# Patient Record
Sex: MALE | Race: WHITE | NOT HISPANIC OR LATINO | URBAN - METROPOLITAN AREA
[De-identification: names, ages, dates, MRNs, and addresses within clinical notes are randomized per-mention and may not be internally consistent; named-entity substitution may affect disease eponyms.]

---

## 2020-07-01 ENCOUNTER — INPATIENT (INPATIENT)
Facility: HOSPITAL | Age: 65
LOS: 0 days | Discharge: HOME | DRG: 282 | End: 2020-07-02
Attending: INTERNAL MEDICINE | Admitting: INTERNAL MEDICINE
Payer: COMMERCIAL

## 2020-07-01 VITALS
TEMPERATURE: 98 F | RESPIRATION RATE: 18 BRPM | SYSTOLIC BLOOD PRESSURE: 164 MMHG | OXYGEN SATURATION: 100 % | HEART RATE: 83 BPM | HEIGHT: 72 IN | WEIGHT: 220.02 LBS | DIASTOLIC BLOOD PRESSURE: 67 MMHG

## 2020-07-01 DIAGNOSIS — Z98.890 OTHER SPECIFIED POSTPROCEDURAL STATES: Chronic | ICD-10-CM

## 2020-07-01 DIAGNOSIS — I21.4 NON-ST ELEVATION (NSTEMI) MYOCARDIAL INFARCTION: ICD-10-CM

## 2020-07-01 LAB
GLUCOSE BLDC GLUCOMTR-MCNC: 168 MG/DL — HIGH (ref 70–99)
GLUCOSE BLDC GLUCOMTR-MCNC: 177 MG/DL — HIGH (ref 70–99)
GLUCOSE BLDC GLUCOMTR-MCNC: 190 MG/DL — HIGH (ref 70–99)

## 2020-07-01 PROCEDURE — 93010 ELECTROCARDIOGRAM REPORT: CPT

## 2020-07-01 PROCEDURE — 93454 CORONARY ARTERY ANGIO S&I: CPT | Mod: 26

## 2020-07-01 PROCEDURE — 99152 MOD SED SAME PHYS/QHP 5/>YRS: CPT

## 2020-07-01 RX ORDER — GLUCAGON INJECTION, SOLUTION 0.5 MG/.1ML
1 INJECTION, SOLUTION SUBCUTANEOUS ONCE
Refills: 0 | Status: DISCONTINUED | OUTPATIENT
Start: 2020-07-01 | End: 2020-07-02

## 2020-07-01 RX ORDER — PANTOPRAZOLE SODIUM 20 MG/1
1 TABLET, DELAYED RELEASE ORAL
Qty: 0 | Refills: 0 | DISCHARGE

## 2020-07-01 RX ORDER — ATORVASTATIN CALCIUM 80 MG/1
1 TABLET, FILM COATED ORAL
Qty: 0 | Refills: 0 | DISCHARGE

## 2020-07-01 RX ORDER — INSULIN ASPART 100 [IU]/ML
0 INJECTION, SOLUTION SUBCUTANEOUS
Qty: 0 | Refills: 0 | DISCHARGE

## 2020-07-01 RX ORDER — EZETIMIBE 10 MG/1
1 TABLET ORAL
Qty: 0 | Refills: 0 | DISCHARGE

## 2020-07-01 RX ORDER — DRONEDARONE 400 MG/1
400 TABLET, FILM COATED ORAL
Refills: 0 | Status: DISCONTINUED | OUTPATIENT
Start: 2020-07-01 | End: 2020-07-02

## 2020-07-01 RX ORDER — INSULIN GLARGINE 100 [IU]/ML
15 INJECTION, SOLUTION SUBCUTANEOUS AT BEDTIME
Refills: 0 | Status: DISCONTINUED | OUTPATIENT
Start: 2020-07-01 | End: 2020-07-02

## 2020-07-01 RX ORDER — ASPIRIN/CALCIUM CARB/MAGNESIUM 324 MG
1 TABLET ORAL
Qty: 0 | Refills: 0 | DISCHARGE

## 2020-07-01 RX ORDER — DEXTROSE 50 % IN WATER 50 %
15 SYRINGE (ML) INTRAVENOUS ONCE
Refills: 0 | Status: DISCONTINUED | OUTPATIENT
Start: 2020-07-01 | End: 2020-07-02

## 2020-07-01 RX ORDER — PANTOPRAZOLE SODIUM 20 MG/1
40 TABLET, DELAYED RELEASE ORAL
Refills: 0 | Status: DISCONTINUED | OUTPATIENT
Start: 2020-07-01 | End: 2020-07-02

## 2020-07-01 RX ORDER — ALPRAZOLAM 0.25 MG
1 TABLET ORAL
Refills: 0 | Status: DISCONTINUED | OUTPATIENT
Start: 2020-07-01 | End: 2020-07-02

## 2020-07-01 RX ORDER — INSULIN ASPART 100 [IU]/ML
25 INJECTION, SOLUTION SUBCUTANEOUS
Qty: 0 | Refills: 0 | DISCHARGE

## 2020-07-01 RX ORDER — INSULIN LISPRO 100/ML
VIAL (ML) SUBCUTANEOUS
Refills: 0 | Status: DISCONTINUED | OUTPATIENT
Start: 2020-07-01 | End: 2020-07-02

## 2020-07-01 RX ORDER — CLOPIDOGREL BISULFATE 75 MG/1
75 TABLET, FILM COATED ORAL DAILY
Refills: 0 | Status: DISCONTINUED | OUTPATIENT
Start: 2020-07-01 | End: 2020-07-02

## 2020-07-01 RX ORDER — INSULIN GLARGINE 100 [IU]/ML
85 INJECTION, SOLUTION SUBCUTANEOUS
Qty: 0 | Refills: 0 | DISCHARGE

## 2020-07-01 RX ORDER — DEXTROSE 50 % IN WATER 50 %
12.5 SYRINGE (ML) INTRAVENOUS ONCE
Refills: 0 | Status: DISCONTINUED | OUTPATIENT
Start: 2020-07-01 | End: 2020-07-02

## 2020-07-01 RX ORDER — ASPIRIN/CALCIUM CARB/MAGNESIUM 324 MG
81 TABLET ORAL DAILY
Refills: 0 | Status: DISCONTINUED | OUTPATIENT
Start: 2020-07-01 | End: 2020-07-02

## 2020-07-01 RX ORDER — APIXABAN 2.5 MG/1
1 TABLET, FILM COATED ORAL
Qty: 0 | Refills: 0 | DISCHARGE

## 2020-07-01 RX ORDER — RANOLAZINE 500 MG/1
500 TABLET, FILM COATED, EXTENDED RELEASE ORAL
Refills: 0 | Status: DISCONTINUED | OUTPATIENT
Start: 2020-07-01 | End: 2020-07-02

## 2020-07-01 RX ORDER — METOPROLOL TARTRATE 50 MG
100 TABLET ORAL DAILY
Refills: 0 | Status: DISCONTINUED | OUTPATIENT
Start: 2020-07-01 | End: 2020-07-02

## 2020-07-01 RX ORDER — AMLODIPINE BESYLATE 2.5 MG/1
1 TABLET ORAL
Qty: 0 | Refills: 0 | DISCHARGE

## 2020-07-01 RX ORDER — APIXABAN 2.5 MG/1
5 TABLET, FILM COATED ORAL
Refills: 0 | Status: DISCONTINUED | OUTPATIENT
Start: 2020-07-01 | End: 2020-07-02

## 2020-07-01 RX ORDER — AMLODIPINE BESYLATE 2.5 MG/1
5 TABLET ORAL DAILY
Refills: 0 | Status: DISCONTINUED | OUTPATIENT
Start: 2020-07-01 | End: 2020-07-02

## 2020-07-01 RX ORDER — DEXTROSE 50 % IN WATER 50 %
25 SYRINGE (ML) INTRAVENOUS ONCE
Refills: 0 | Status: DISCONTINUED | OUTPATIENT
Start: 2020-07-01 | End: 2020-07-02

## 2020-07-01 RX ORDER — LISINOPRIL 2.5 MG/1
10 TABLET ORAL DAILY
Refills: 0 | Status: DISCONTINUED | OUTPATIENT
Start: 2020-07-01 | End: 2020-07-02

## 2020-07-01 RX ORDER — CLOPIDOGREL BISULFATE 75 MG/1
75 TABLET, FILM COATED ORAL
Qty: 0 | Refills: 0 | DISCHARGE

## 2020-07-01 RX ORDER — FERROUS SULFATE 325(65) MG
325 TABLET ORAL DAILY
Refills: 0 | Status: DISCONTINUED | OUTPATIENT
Start: 2020-07-01 | End: 2020-07-02

## 2020-07-01 RX ORDER — INSULIN LISPRO 100/ML
25 VIAL (ML) SUBCUTANEOUS
Refills: 0 | Status: DISCONTINUED | OUTPATIENT
Start: 2020-07-01 | End: 2020-07-02

## 2020-07-01 RX ORDER — FERROUS SULFATE 325(65) MG
1 TABLET ORAL
Qty: 0 | Refills: 0 | DISCHARGE

## 2020-07-01 RX ORDER — DRONEDARONE 400 MG/1
1 TABLET, FILM COATED ORAL
Qty: 0 | Refills: 0 | DISCHARGE

## 2020-07-01 RX ORDER — INSULIN LISPRO 100/ML
VIAL (ML) SUBCUTANEOUS AT BEDTIME
Refills: 0 | Status: DISCONTINUED | OUTPATIENT
Start: 2020-07-01 | End: 2020-07-02

## 2020-07-01 RX ORDER — DOCUSATE SODIUM 100 MG
1 CAPSULE ORAL
Qty: 0 | Refills: 0 | DISCHARGE

## 2020-07-01 RX ORDER — SODIUM CHLORIDE 9 MG/ML
1000 INJECTION, SOLUTION INTRAVENOUS
Refills: 0 | Status: DISCONTINUED | OUTPATIENT
Start: 2020-07-01 | End: 2020-07-02

## 2020-07-01 RX ORDER — INSULIN DETEMIR 100/ML (3)
15 INSULIN PEN (ML) SUBCUTANEOUS
Qty: 0 | Refills: 0 | DISCHARGE

## 2020-07-01 RX ORDER — ISOSORBIDE DINITRATE 5 MG/1
15 TABLET ORAL
Refills: 0 | Status: DISCONTINUED | OUTPATIENT
Start: 2020-07-01 | End: 2020-07-02

## 2020-07-01 RX ORDER — ATORVASTATIN CALCIUM 80 MG/1
40 TABLET, FILM COATED ORAL AT BEDTIME
Refills: 0 | Status: DISCONTINUED | OUTPATIENT
Start: 2020-07-01 | End: 2020-07-02

## 2020-07-01 RX ADMIN — INSULIN GLARGINE 15 UNIT(S): 100 INJECTION, SOLUTION SUBCUTANEOUS at 22:16

## 2020-07-01 RX ADMIN — Medication 1 MILLIGRAM(S): at 18:42

## 2020-07-01 RX ADMIN — PANTOPRAZOLE SODIUM 40 MILLIGRAM(S): 20 TABLET, DELAYED RELEASE ORAL at 18:41

## 2020-07-01 RX ADMIN — ISOSORBIDE DINITRATE 15 MILLIGRAM(S): 5 TABLET ORAL at 22:53

## 2020-07-01 RX ADMIN — ATORVASTATIN CALCIUM 40 MILLIGRAM(S): 80 TABLET, FILM COATED ORAL at 22:16

## 2020-07-01 RX ADMIN — DRONEDARONE 400 MILLIGRAM(S): 400 TABLET, FILM COATED ORAL at 22:15

## 2020-07-01 RX ADMIN — RANOLAZINE 500 MILLIGRAM(S): 500 TABLET, FILM COATED, EXTENDED RELEASE ORAL at 18:41

## 2020-07-01 NOTE — H&P CARDIOLOGY - HISTORY OF PRESENT ILLNESS
65 year old  male h/o triple vessel CAD, MI x 2, PCI x 2 in [place] with stents x 4 (vessels unknown 1999 and 2014), PVD s/p left LE intervention, parox afib on Eliquis (LAST DOSE: #####), HTN, HLD, GERD, T2DM (managed by ####           A1C ####   complicated by PVD/CAD) who was driving from [place] to [place] and experienced chest pain radiating to left arm which resolved with massaging area and also c/o chest pain when climbing stairs, which was new. Pt admitted to [facility] 6/29/2020 and noted to have RC: peak Cr 1.6 (pt reports baseline 1.3) that normalized to 1.2(on 7/1) after IVF. Pt peak CKMB mass 10.9, now down trending. Labs 7/1 H/H 12.0/37.2, plts 184, wbc 6.44, bun 20, creat 1.2. Pt transferred to [facility] for left heart cath today. Prior to this event pt reports good exercise tolerance, no problem with stairs. Pt with laceration above left eye from hitting on door 6/29/2020.  Outpt cards in [place]: Dr Barak Arenas  Narrative:     Symptoms:        Angina (Class): 3       Ischemic Symptoms: chest pain    Heart Failure: NONE       Systolic/Diastolic/Combined:        NYHA Class (within 2 weeks):     Assessment of LVEF: NOT DONE       EF: NOT DONE       Assessed by:        Date:     Prior Cardiac Interventions:       PCI's: vessels unknown done in [place] '99 and '14       CABG: none    Noninvasive Testing: NOT DONE  Stress Test: Date:        Protocol:        Duration of Exercise:        Symptoms:        EKG Changes:        DTS:        Myocardial Imaging:        Risk Assessment:     Echo: NOT DONE    Antianginal Therapies:        Beta Blockers: toprol       Calcium Channel Blockers norvasc       Long Acting Nitrates: isosorbide dinatrate       Ranexa: no    Associated Risk Factors:        Cerebrovascular Disease: N/A       Chronic Lung Disease: N/A       Peripheral Arterial Disease: n/a       Chronic Kidney Disease (if yes, what is GFR): N/A GRF = 60       Uncontrolled Diabetes (if yes, what is HgbA1C or FBS): N/A       Poorly Controlled Hypertension (if yes, what is SBP): N/A       Morbid Obesity (if yes, what is BMI): N/A       History of Recent Ventricular Arrhythmia: N/A       Inability to Ambulate Safely: N/A       Need for Therapeutic Anticoagulation: yes, afib on eliquis       Antiplatelet or Contrast Allergy: no 65 year old  male h/o triple vessel CAD, MI x 2, PCI x 2 in [place] with stents x 4 (vessels unknown 1999 and 2014), PVD s/p left LE intervention and stent 2015, parox afib on Eliquis (LAST DOSE: Sunday 6/28), HTN, HLD, GERD, T2DM (managed by endo: Dr Currie,  A1C 7.2   complicated by PVD/CAD) who was driving from [place] to [place] and experienced chest pain radiating to left arm which resolved with massaging area and also c/o chest pain when climbing stairs, which was new. Pt admitted to [facility] 6/29/2020 and noted to have RC: peak Cr 1.6 (pt reports baseline 1.3) that normalized to 1.2(on 7/1) after IVF. Pt peak CKMB mass 10.9, now down trending. Labs 7/1 H/H 12.0/37.2, plts 184, wbc 6.44, bun 20, creat 1.2. Pt transferred to [facility] for left heart cath today. Prior to this event pt reports good exercise tolerance, no problem with stairs. Pt with laceration above left eye from hitting on door 6/29/2020.  Outpt cards in [place]: Dr Barak Arenas    Symptoms:        Angina (Class): 3       Ischemic Symptoms: chest pain    Heart Failure: NONE       Systolic/Diastolic/Combined:        NYHA Class (within 2 weeks):     Assessment of LVEF: NOT DONE       EF: NOT DONE       Assessed by:        Date:     Prior Cardiac Interventions:       PCI's: vessels unknown done in [place] '99 and '14       CABG: none    Noninvasive Testing: NOT DONE  Stress Test: Date:        Protocol:        Duration of Exercise:        Symptoms:        EKG Changes:        DTS:        Myocardial Imaging:        Risk Assessment:     Echo: NOT DONE    Antianginal Therapies:        Beta Blockers: toprol       Calcium Channel Blockers norvasc       Long Acting Nitrates: isosorbide dinatrate       Ranexa: no    Associated Risk Factors:        Cerebrovascular Disease: N/A       Chronic Lung Disease: N/A       Peripheral Arterial Disease: n/a       Chronic Kidney Disease (if yes, what is GFR): N/A GRF = 60       Uncontrolled Diabetes (if yes, what is HgbA1C or FBS): N/A       Poorly Controlled Hypertension (if yes, what is SBP): N/A       Morbid Obesity (if yes, what is BMI): N/A       History of Recent Ventricular Arrhythmia: N/A       Inability to Ambulate Safely: N/A       Need for Therapeutic Anticoagulation: yes, afib on eliquis       Antiplatelet or Contrast Allergy: no

## 2020-07-01 NOTE — H&P CARDIOLOGY - ADDITIONAL PE
Mallampati 2  bilat groins without rash or breakdown  2+ DP/PT/radial pulses bilat  right wrist deformity s/p fracture

## 2020-07-01 NOTE — H&P CARDIOLOGY - PMH
Atrial fibrillation  on ELIQUIS  CAD (coronary artery disease)  PCI x 2 in '99 and '14 with stents x 4 in [place] (vessels unknown)  Diabetes  TYPE II on INSULIN  GERD (gastroesophageal reflux disease)    HLD (hyperlipidemia)    HTN (hypertension)    Myocardial infarction  x 2  '99 and ' 14 with stents x 4  PVD (peripheral vascular disease)  with left lower extremity intervention Atrial fibrillation  on ELIQUIS  CAD (coronary artery disease)  PCI x 2 in '99 and '14 with stents x 4 in [place] (vessels unknown)  Diabetes  TYPE II on INSULIN  GERD (gastroesophageal reflux disease)    HLD (hyperlipidemia)    HTN (hypertension)    Myocardial infarction  x 2  '99 and ' 14 with stents x 4  PVD (peripheral vascular disease)  with left lower extremity intervention STENT X 1 2015

## 2020-07-01 NOTE — H&P CARDIOLOGY - FAMILY HISTORY
Grandparent  Still living? No  Family history of esophageal cancer, Age at diagnosis: Age Unknown     Father  Still living? No  Family history of chronic ischemic heart disease, Age at diagnosis: Age Unknown  Family history of diabetes mellitus in grandfather, Age at diagnosis: Age Unknown

## 2020-07-02 VITALS
SYSTOLIC BLOOD PRESSURE: 110 MMHG | HEART RATE: 63 BPM | OXYGEN SATURATION: 97 % | TEMPERATURE: 98 F | RESPIRATION RATE: 18 BRPM | DIASTOLIC BLOOD PRESSURE: 63 MMHG

## 2020-07-02 LAB
GLUCOSE BLDC GLUCOMTR-MCNC: 106 MG/DL — HIGH (ref 70–99)
GLUCOSE BLDC GLUCOMTR-MCNC: 160 MG/DL — HIGH (ref 70–99)
GLUCOSE BLDC GLUCOMTR-MCNC: 165 MG/DL — HIGH (ref 70–99)
GLUCOSE BLDC GLUCOMTR-MCNC: 215 MG/DL — HIGH (ref 70–99)

## 2020-07-02 PROCEDURE — C1894: CPT

## 2020-07-02 PROCEDURE — 93005 ELECTROCARDIOGRAM TRACING: CPT

## 2020-07-02 PROCEDURE — 82962 GLUCOSE BLOOD TEST: CPT

## 2020-07-02 PROCEDURE — 99152 MOD SED SAME PHYS/QHP 5/>YRS: CPT

## 2020-07-02 PROCEDURE — C1887: CPT

## 2020-07-02 PROCEDURE — 99233 SBSQ HOSP IP/OBS HIGH 50: CPT

## 2020-07-02 PROCEDURE — C1769: CPT

## 2020-07-02 PROCEDURE — 93458 L HRT ARTERY/VENTRICLE ANGIO: CPT

## 2020-07-02 PROCEDURE — 93454 CORONARY ARTERY ANGIO S&I: CPT

## 2020-07-02 PROCEDURE — 93010 ELECTROCARDIOGRAM REPORT: CPT

## 2020-07-02 RX ORDER — LISINOPRIL 2.5 MG/1
1 TABLET ORAL
Qty: 30 | Refills: 0
Start: 2020-07-02 | End: 2020-07-31

## 2020-07-02 RX ORDER — METOPROLOL TARTRATE 50 MG
1 TABLET ORAL
Qty: 0 | Refills: 0 | DISCHARGE

## 2020-07-02 RX ORDER — METOPROLOL TARTRATE 50 MG
1 TABLET ORAL
Qty: 0 | Refills: 0 | DISCHARGE
Start: 2020-07-02

## 2020-07-02 RX ORDER — ISOSORBIDE DINITRATE 5 MG/1
15 TABLET ORAL
Qty: 0 | Refills: 0 | DISCHARGE

## 2020-07-02 RX ORDER — RANOLAZINE 500 MG/1
1 TABLET, FILM COATED, EXTENDED RELEASE ORAL
Qty: 60 | Refills: 0
Start: 2020-07-02 | End: 2020-07-31

## 2020-07-02 RX ORDER — ALPRAZOLAM 0.25 MG
1 TABLET ORAL
Qty: 0 | Refills: 0 | DISCHARGE

## 2020-07-02 RX ORDER — LISINOPRIL 2.5 MG/1
1 TABLET ORAL
Qty: 0 | Refills: 0 | DISCHARGE

## 2020-07-02 RX ORDER — ISOSORBIDE DINITRATE 5 MG/1
3 TABLET ORAL
Qty: 0 | Refills: 0 | DISCHARGE
Start: 2020-07-02

## 2020-07-02 RX ORDER — ALPRAZOLAM 0.25 MG
1 TABLET ORAL
Qty: 2 | Refills: 0
Start: 2020-07-02 | End: 2020-07-02

## 2020-07-02 RX ORDER — ISOSORBIDE DINITRATE 5 MG/1
1 TABLET ORAL
Qty: 0 | Refills: 0 | DISCHARGE

## 2020-07-02 RX ADMIN — INSULIN GLARGINE 15 UNIT(S): 100 INJECTION, SOLUTION SUBCUTANEOUS at 17:17

## 2020-07-02 RX ADMIN — Medication 25 UNIT(S): at 11:54

## 2020-07-02 RX ADMIN — Medication 100 MILLIGRAM(S): at 06:27

## 2020-07-02 RX ADMIN — AMLODIPINE BESYLATE 5 MILLIGRAM(S): 2.5 TABLET ORAL at 06:27

## 2020-07-02 RX ADMIN — Medication 25 UNIT(S): at 07:53

## 2020-07-02 RX ADMIN — PANTOPRAZOLE SODIUM 40 MILLIGRAM(S): 20 TABLET, DELAYED RELEASE ORAL at 06:27

## 2020-07-02 RX ADMIN — RANOLAZINE 500 MILLIGRAM(S): 500 TABLET, FILM COATED, EXTENDED RELEASE ORAL at 06:27

## 2020-07-02 RX ADMIN — ISOSORBIDE DINITRATE 15 MILLIGRAM(S): 5 TABLET ORAL at 06:26

## 2020-07-02 RX ADMIN — Medication 2: at 07:53

## 2020-07-02 RX ADMIN — CLOPIDOGREL BISULFATE 75 MILLIGRAM(S): 75 TABLET, FILM COATED ORAL at 11:08

## 2020-07-02 RX ADMIN — Medication 1 MILLIGRAM(S): at 16:21

## 2020-07-02 RX ADMIN — Medication 325 MILLIGRAM(S): at 11:08

## 2020-07-02 RX ADMIN — APIXABAN 5 MILLIGRAM(S): 2.5 TABLET, FILM COATED ORAL at 06:27

## 2020-07-02 RX ADMIN — Medication 1 MILLIGRAM(S): at 06:27

## 2020-07-02 RX ADMIN — LISINOPRIL 10 MILLIGRAM(S): 2.5 TABLET ORAL at 06:27

## 2020-07-02 RX ADMIN — DRONEDARONE 400 MILLIGRAM(S): 400 TABLET, FILM COATED ORAL at 06:27

## 2020-07-02 RX ADMIN — Medication 1: at 11:54

## 2020-07-02 RX ADMIN — Medication 81 MILLIGRAM(S): at 11:08

## 2020-07-02 NOTE — DISCHARGE NOTE PROVIDER - NSDCMRMEDTOKEN_GEN_ALL_CORE_FT
ALPRAZolam 1 mg oral tablet: 1 tab(s) orally 2 times a day MDD:2 tabs  amLODIPine 5 mg oral tablet: 1 tab(s) orally once a day HOME and HOSP  Aspirin Enteric Coated 81 mg oral delayed release tablet: 1 tab(s) orally once a day home and hosp  atorvastatin 40 mg oral tablet: 1 tab(s) orally once a day HOME  Colace 100 mg oral capsule: 1 cap(s) orally 2 times a day HOSP  Eliquis 5 mg oral tablet: 1 tab(s) orally 2 times a day HOME LAST DOSE SUN 6/28  ferrous sulfate 325 mg (65 mg elemental iron) oral tablet: 1 tab(s) orally once a day HOSP  insulin aspart 100 units/mL subcutaneous solution: unit(s) subcutaneous 3 times a day SLIDING SCALE HOSP  insulin detemir 100 units/mL subcutaneous solution: 15 unit(s) subcutaneous once a day HOSP  isosorbide dinitrate 5 mg oral tablet: 3 tab(s) orally 2 times a day  lisinopril 10 mg oral tablet: 1 tab(s) orally once a day HOSP  metoprolol succinate 100 mg oral tablet, extended release: 1 tab(s) orally once a day  Multaq 400 mg oral tablet: 1 tab(s) orally 2 times a day HOME and HOSP  NovoLOG FlexPen 100 units/mL injectable solution: 25 unit(s) injectable 3 times a day (before meals) HOME  Plavix 75 mg oral tablet: 75 milligram(s) orally once a day HOME and HOSP  Protonix 40 mg oral delayed release tablet: 1 tab(s) orally once a day HOME and HOSP  ranolazine 500 mg oral tablet, extended release: 1 tab(s) orally 2 times a day  Toujeo SoloStar 300 units/mL subcutaneous solution: 85 unit(s) subcutaneous once a day IN AM HOME  Zetia 10 mg oral tablet: 1 tab(s) orally once a day home

## 2020-07-02 NOTE — DISCHARGE NOTE NURSING/CASE MANAGEMENT/SOCIAL WORK - PATIENT PORTAL LINK FT
You can access the FollowMyHealth Patient Portal offered by [facility] by registering at the following website: [email or web address]. By joining [facility]’s FollowMyHealth portal, you will also be able to view your health information using other applications (apps) compatible with our system.

## 2020-07-02 NOTE — DISCHARGE NOTE PROVIDER - NSDCCPCAREPLAN_GEN_ALL_CORE_FT
PRINCIPAL DISCHARGE DIAGNOSIS  Diagnosis: Chest pain  Assessment and Plan of Treatment:   HOME CARE INSTRUCTIONS  For the next few days, avoid physical activities that bring on chest pain. Continue physical activities as directed.  Do not Informed pt of the various negative side effects of smoking including risk of COPD, Lung Ca etc  Strongly recommended that pt stops smoking and pt given various options of smoking cessasion tools such as NRT's and other pharmacotherapies.  Avoid drinking alcohol.   Only take over-the-counter or prescription medicine for pain, discomfort, or fever as directed by your caregiver.  Follow your caregiver's suggestions for further testing if your chest pain does not go away.  Keep any follow-up appointments you made. If you do not go to an appointment, you could develop lasting (chronic) problems with pain. If there is any problem keeping an appointment, you must call to reschedule.   SEEK MEDICAL CARE IF:  You think you are having problems from the medicine you are taking. Read your medicine instructions carefully.  Your chest pain does not go away, even after treatment.  You develop a rash with blisters on your chest.  SEEK IMMEDIATE MEDICAL CARE IF:  You have increased chest pain or pain that spreads to your arm, neck, jaw, back, or abdomen.   You develop shortness of breath, an increasing cough, or you are coughing up blood.  You have severe back or abdominal pain, feel nauseous, or vomit.  You develop severe weakness, fainting, or chills.  You have a fever.  THIS IS AN EMERGENCY. Do not wait to see if the pain will go away. Get medical help at once. Call your local emergency services (_____________________). Do not drive yourself to the hospital.      SECONDARY DISCHARGE DIAGNOSES  Diagnosis: Diabetes mellitus  Assessment and Plan of Treatment: HgA1C this admission.  Make sure you get your HgA1c checked every three months.  If you take oral diabetes medications, check your blood glucose two times a day.  If you take insulin, check your blood glucose before meals and at bedtime.  It's important not to skip any meals.  Keep a log of your blood glucose results and always take it with you to your doctor appointments.  Keep a list of your current medications including injectables and over the counter medications and bring this medication list with you to all your doctor appointments.  If you have not seen your ophthalmologist this year call for appointment.  Check your feet daily for redness, sores, or openings. Do not self treat. If no improvement in two days call your primary care physician for an appointment.  Low blood sugar (hypoglycemia) is a blood sugar below 70mg/dl. Check your blood sugar if you feel signs/symptoms of hypoglycemia. If your blood sugar is below 70 take 15 grams of carbohydrates (ex 4 oz of apple juice, 3-4 glucose tablets, or 4-6 oz of regular soda) wait 15 minutes and repeat blood sugar to make sure it comes up above 70.  If your blood sugar is above 70 and you are due for a meal, have a meal.  If you are not due for a meal have a snack.  This snack helps keeps your blood sugar at a safe range.    Diagnosis: Atrial fibrillation  Assessment and Plan of Treatment: Atrial fibrillation is the most common heart rhythm problem & has the risk of stroke & heart attack  It helps if you control your blood pressure, not drink more than 1-2 alcohol drinks per day, cut down on caffeine, getting treatment for over active thyroid gland, & getting exercise  Call your doctor if you feel your heart racing or beating unusually, chest tightness or pain, lightheaded, faint, shortness of breath especially with exercise  It is important to take your heart medication as prescribed PRINCIPAL DISCHARGE DIAGNOSIS  Diagnosis: Chest pain  Assessment and Plan of Treatment:   HOME CARE INSTRUCTIONS  For the next few days, avoid physical activities that bring on chest pain. Continue physical activities as directed.  Do not Informed pt of the various negative side effects of smoking including risk of COPD, Lung Ca etc  Strongly recommended that pt stops smoking and pt given various options of smoking cessasion tools such as NRT's and other pharmacotherapies.  Avoid drinking alcohol.   Only take over-the-counter or prescription medicine for pain, discomfort, or fever as directed by your caregiver.  Follow your caregiver's suggestions for further testing if your chest pain does not go away.  Keep any follow-up appointments you made. If you do not go to an appointment, you could develop lasting (chronic) problems with pain. If there is any problem keeping an appointment, you must call to reschedule.   SEEK MEDICAL CARE IF:  You think you are having problems from the medicine you are taking. Read your medicine instructions carefully.  Your chest pain does not go away, even after treatment.  You develop a rash with blisters on your chest.  SEEK IMMEDIATE MEDICAL CARE IF:  You have increased chest pain or pain that spreads to your arm, neck, jaw, back, or abdomen.   You develop shortness of breath, an increasing cough, or you are coughing up blood.  You have severe back or abdominal pain, feel nauseous, or vomit.  You develop severe weakness, fainting, or chills.  You have a fever.  THIS IS AN EMERGENCY. Do not wait to see if the pain will go away. Get medical help at once. Call your local emergency services (_____________________). Do not drive yourself to the hospital.      SECONDARY DISCHARGE DIAGNOSES  Diagnosis: CAD (coronary artery disease)  Assessment and Plan of Treatment: Coronary artery disease is a condition where the arteries the supply the heart muscle get clogges with fatty deposits & puts you at risk for a heart attack  Call your doctor if you have any new pain, pressure, or discomfort in the center of your chest, pain, tingling or discomfort in arms, back, neck, jaw, or stomach, shortness of breath, nausea, vomiting, burping or heartburn, sweating, cold and clammy skin, racing or abnormal heartbeat for more than 10 minutes or if they keep coming & going.  Call 911 and do not tr to get to hospital by care  You can help yourself with lefestyle changes (quitting smoking if you Informed pt of the various negative side effects of smoking including risk of COPD, Lung Ca etc  Strongly recommended that pt stops smoking and pt given various options of smoking cessasion tools such as NRT's and other pharmacotherapies), eat lots of fruits & vegetables & low fat dairy products, not a lot of meat & fatty foods, walk or some form of physical activity most days of the week, lose weight if you are overweight  Take your cardiac medication as prescribed to lower cholesterol, to lower blood pressure, aspirin to prevent blood clots, and diabetes control  Make sure to keep appointments with doctor for cardiac follow up care    Diagnosis: Diabetes mellitus  Assessment and Plan of Treatment: HgA1C this admission.  Make sure you get your HgA1c checked every three months.  If you take oral diabetes medications, check your blood glucose two times a day.  If you take insulin, check your blood glucose before meals and at bedtime.  It's important not to skip any meals.  Keep a log of your blood glucose results and always take it with you to your doctor appointments.  Keep a list of your current medications including injectables and over the counter medications and bring this medication list with you to all your doctor appointments.  If you have not seen your ophthalmologist this year call for appointment.  Check your feet daily for redness, sores, or openings. Do not self treat. If no improvement in two days call your primary care physician for an appointment.  Low blood sugar (hypoglycemia) is a blood sugar below 70mg/dl. Check your blood sugar if you feel signs/symptoms of hypoglycemia. If your blood sugar is below 70 take 15 grams of carbohydrates (ex 4 oz of apple juice, 3-4 glucose tablets, or 4-6 oz of regular soda) wait 15 minutes and repeat blood sugar to make sure it comes up above 70.  If your blood sugar is above 70 and you are due for a meal, have a meal.  If you are not due for a meal have a snack.  This snack helps keeps your blood sugar at a safe range.    Diagnosis: Atrial fibrillation  Assessment and Plan of Treatment: Atrial fibrillation is the most common heart rhythm problem & has the risk of stroke & heart attack  It helps if you control your blood pressure, not drink more than 1-2 alcohol drinks per day, cut down on caffeine, getting treatment for over active thyroid gland, & getting exercise  Call your doctor if you feel your heart racing or beating unusually, chest tightness or pain, lightheaded, faint, shortness of breath especially with exercise  It is important to take your heart medication as prescribed

## 2020-07-02 NOTE — PROGRESS NOTE ADULT - SUBJECTIVE AND OBJECTIVE BOX
SUBJ:    Home Medications:  amLODIPine 5 mg oral tablet: 1 tab(s) orally once a day HOME and HOSP (01 Jul 2020 13:20)  Aspirin Enteric Coated 81 mg oral delayed release tablet: 1 tab(s) orally once a day home and hosp (01 Jul 2020 13:20)  atorvastatin 40 mg oral tablet: 1 tab(s) orally once a day HOME (01 Jul 2020 13:20)  Colace 100 mg oral capsule: 1 cap(s) orally 2 times a day HOSP (01 Jul 2020 13:20)  Eliquis 5 mg oral tablet: 1 tab(s) orally 2 times a day HOME LAST DOSE SUN 6/28 (01 Jul 2020 13:20)  ferrous sulfate 325 mg (65 mg elemental iron) oral tablet: 1 tab(s) orally once a day HOSP (01 Jul 2020 13:20)  insulin aspart 100 units/mL subcutaneous solution: unit(s) subcutaneous 3 times a day SLIDING SCALE HOSP (01 Jul 2020 13:20)  insulin detemir 100 units/mL subcutaneous solution: 15 unit(s) subcutaneous once a day HOSP (01 Jul 2020 13:20)  isosorbide dinitrate 5 mg oral tablet: 3 tab(s) orally 2 times a day (02 Jul 2020 14:11)  metoprolol succinate 100 mg oral tablet, extended release: 1 tab(s) orally once a day (02 Jul 2020 14:11)  Multaq 400 mg oral tablet: 1 tab(s) orally 2 times a day HOME and HOSP (01 Jul 2020 13:20)  NovoLOG FlexPen 100 units/mL injectable solution: 25 unit(s) injectable 3 times a day (before meals) HOME (01 Jul 2020 13:20)  Plavix 75 mg oral tablet: 75 milligram(s) orally once a day HOME and HOSP (01 Jul 2020 13:20)  Protonix 40 mg oral delayed release tablet: 1 tab(s) orally once a day HOME and HOSP (01 Jul 2020 13:20)  Toujeo SoloStar 300 units/mL subcutaneous solution: 85 unit(s) subcutaneous once a day IN AM HOME (01 Jul 2020 13:20)  Zetia 10 mg oral tablet: 1 tab(s) orally once a day home (01 Jul 2020 13:20)      MEDICATIONS  (STANDING):  ALPRAZolam 1 milliGRAM(s) Oral two times a day  amLODIPine   Tablet 5 milliGRAM(s) Oral daily  apixaban 5 milliGRAM(s) Oral two times a day  aspirin enteric coated 81 milliGRAM(s) Oral daily  atorvastatin 40 milliGRAM(s) Oral at bedtime  clopidogrel Tablet 75 milliGRAM(s) Oral daily  dextrose 5%. 1000 milliLiter(s) (50 mL/Hr) IV Continuous <Continuous>  dextrose 50% Injectable 12.5 Gram(s) IV Push once  dextrose 50% Injectable 25 Gram(s) IV Push once  dextrose 50% Injectable 25 Gram(s) IV Push once  dronedarone 400 milliGRAM(s) Oral two times a day  ferrous    sulfate 325 milliGRAM(s) Oral daily  insulin glargine Injectable (LANTUS) 15 Unit(s) SubCutaneous at bedtime  insulin lispro (HumaLOG) corrective regimen sliding scale   SubCutaneous three times a day before meals  insulin lispro (HumaLOG) corrective regimen sliding scale   SubCutaneous at bedtime  insulin lispro Injectable (HumaLOG) 25 Unit(s) SubCutaneous three times a day before meals  isosorbide   dinitrate Tablet (ISORDIL) 15 milliGRAM(s) Oral two times a day  lisinopril 10 milliGRAM(s) Oral daily  metoprolol succinate  milliGRAM(s) Oral daily  pantoprazole    Tablet 40 milliGRAM(s) Oral before breakfast  ranolazine 500 milliGRAM(s) Oral two times a day    MEDICATIONS  (PRN):  dextrose 40% Gel 15 Gram(s) Oral once PRN Blood Glucose LESS THAN 70 milliGRAM(s)/deciliter  glucagon  Injectable 1 milliGRAM(s) IntraMuscular once PRN Glucose LESS THAN 70 milligrams/deciliter      Vital Signs Last 24 Hrs  T(C): 36.4 (02 Jul 2020 15:37), Max: 37.1 (01 Jul 2020 18:13)  T(F): 97.5 (02 Jul 2020 15:37), Max: 98.7 (01 Jul 2020 18:13)  HR: 63 (02 Jul 2020 15:37) (63 - 82)  BP: 110/63 (02 Jul 2020 15:37) (110/63 - 162/92)  BP(mean): --  RR: 18 (02 Jul 2020 15:37) (18 - 18)  SpO2: 97% (02 Jul 2020 15:37) (95% - 99%)    REVIEW OF SYSTEMS:  As per HPI, otherwise unremarkable.     PHYSICAL EXAM:  · CONSTITUTIONAL: Well-developed, well nourished      · EYES: EOMI; PERRL; no drainage or redness  · NECK: No bruits; no thyromegaly or nodules  ·RESPIRATORY:   airway patent; breath sounds equal; good air movement; respirations non-labored; clear to auscultation bilaterally; no chest wall tenderness; no intercostal retractions; no rales,rhonchi or wheeze  · CARDIOVASCULAR: regular rate and rhythm  no rub  no murmur  normal PMI  . GASTROINTESTINAL:  no distention; no masses palpable; bowel sounds normal  · EXTREMITIES: No cyanosis, clubbing or edema  · VASCULAR:  Equal and normal pulses (radial, femoral)  ·NEUROLOGICAL:  Alert and oriented x 3; sensation intact; deep reflexes intact; cranial nerves intact; no spontaneous movement; superficial reflexes intact; normal strength  · SKIN: No lesions; no rash  . LYMPH NODES: No lymphadedenopathy  · MUSCULOSKELETAL:  No calf tenderness  no joint swelling	    TELEMETRY:    ECG:    TTE:    LABS:    RADIOLOGY & ADDITIONAL STUDIES:    IMPRESSION AND PLAN:        Michaela Jennings MD, MPH, GEMA, RPVI, FACC  Cardiovascular Specialist Attending  [facility]  C: [phone] (text preferred and/or call)  E: [email or web address] SUBJ:  s/p LHC with chronic severe obstructive CAD without PCI and extensive collateralization. Started on ranolazine 500 mg BID. Mild dizziness. Denies chest pain and shortness of breath. Discharge planning.     Home Medications:  amLODIPine 5 mg oral tablet: 1 tab(s) orally once a day HOME and HOSP (01 Jul 2020 13:20)  Aspirin Enteric Coated 81 mg oral delayed release tablet: 1 tab(s) orally once a day home and hosp (01 Jul 2020 13:20)  atorvastatin 40 mg oral tablet: 1 tab(s) orally once a day HOME (01 Jul 2020 13:20)  Colace 100 mg oral capsule: 1 cap(s) orally 2 times a day HOSP (01 Jul 2020 13:20)  Eliquis 5 mg oral tablet: 1 tab(s) orally 2 times a day HOME LAST DOSE SUN 6/28 (01 Jul 2020 13:20)  ferrous sulfate 325 mg (65 mg elemental iron) oral tablet: 1 tab(s) orally once a day HOSP (01 Jul 2020 13:20)  insulin aspart 100 units/mL subcutaneous solution: unit(s) subcutaneous 3 times a day SLIDING SCALE HOSP (01 Jul 2020 13:20)  insulin detemir 100 units/mL subcutaneous solution: 15 unit(s) subcutaneous once a day HOSP (01 Jul 2020 13:20)  isosorbide dinitrate 5 mg oral tablet: 3 tab(s) orally 2 times a day (02 Jul 2020 14:11)  metoprolol succinate 100 mg oral tablet, extended release: 1 tab(s) orally once a day (02 Jul 2020 14:11)  Multaq 400 mg oral tablet: 1 tab(s) orally 2 times a day HOME and HOSP (01 Jul 2020 13:20)  NovoLOG FlexPen 100 units/mL injectable solution: 25 unit(s) injectable 3 times a day (before meals) HOME (01 Jul 2020 13:20)  Plavix 75 mg oral tablet: 75 milligram(s) orally once a day HOME and HOSP (01 Jul 2020 13:20)  Protonix 40 mg oral delayed release tablet: 1 tab(s) orally once a day HOME and HOSP (01 Jul 2020 13:20)  Toujeo SoloStar 300 units/mL subcutaneous solution: 85 unit(s) subcutaneous once a day IN AM HOME (01 Jul 2020 13:20)  Zetia 10 mg oral tablet: 1 tab(s) orally once a day home (01 Jul 2020 13:20)    MEDICATIONS  (STANDING):  ALPRAZolam 1 milliGRAM(s) Oral two times a day  amLODIPine   Tablet 5 milliGRAM(s) Oral daily  apixaban 5 milliGRAM(s) Oral two times a day  aspirin enteric coated 81 milliGRAM(s) Oral daily  atorvastatin 40 milliGRAM(s) Oral at bedtime  clopidogrel Tablet 75 milliGRAM(s) Oral daily  dextrose 5%. 1000 milliLiter(s) (50 mL/Hr) IV Continuous <Continuous>  dextrose 50% Injectable 12.5 Gram(s) IV Push once  dextrose 50% Injectable 25 Gram(s) IV Push once  dextrose 50% Injectable 25 Gram(s) IV Push once  dronedarone 400 milliGRAM(s) Oral two times a day  ferrous    sulfate 325 milliGRAM(s) Oral daily  insulin glargine Injectable (LANTUS) 15 Unit(s) SubCutaneous at bedtime  insulin lispro (HumaLOG) corrective regimen sliding scale   SubCutaneous three times a day before meals  insulin lispro (HumaLOG) corrective regimen sliding scale   SubCutaneous at bedtime  insulin lispro Injectable (HumaLOG) 25 Unit(s) SubCutaneous three times a day before meals  isosorbide   dinitrate Tablet (ISORDIL) 15 milliGRAM(s) Oral two times a day  lisinopril 10 milliGRAM(s) Oral daily  metoprolol succinate  milliGRAM(s) Oral daily  pantoprazole    Tablet 40 milliGRAM(s) Oral before breakfast  ranolazine 500 milliGRAM(s) Oral two times a day    MEDICATIONS  (PRN):  dextrose 40% Gel 15 Gram(s) Oral once PRN Blood Glucose LESS THAN 70 milliGRAM(s)/deciliter  glucagon  Injectable 1 milliGRAM(s) IntraMuscular once PRN Glucose LESS THAN 70 milligrams/deciliter    Vital Signs Last 24 Hrs  T(C): 36.4 (02 Jul 2020 15:37), Max: 37.1 (01 Jul 2020 18:13)  T(F): 97.5 (02 Jul 2020 15:37), Max: 98.7 (01 Jul 2020 18:13)  HR: 63 (02 Jul 2020 15:37) (63 - 82)  BP: 110/63 (02 Jul 2020 15:37) (110/63 - 162/92)  RR: 18 (02 Jul 2020 15:37) (18 - 18)  SpO2: 97% (02 Jul 2020 15:37) (95% - 99%)    REVIEW OF SYSTEMS:  As per HPI, otherwise unremarkable.     PHYSICAL EXAM:  · CONSTITUTIONAL: Well-developed, well nourished      · EYES: no drainage or redness  · NECK: Supple  ·RESPIRATORY: respirations non-labored; clear to auscultation bilaterally  · CARDIOVASCULAR: regular rate and rhythm  . GASTROINTESTINAL:  no distention  · EXTREMITIES: No cyanosis, clubbing  · VASCULAR:  Equal and normal pulses (radial);  right wrist without hematoma.   ·NEUROLOGICAL:  Alert and oriented x 3.  · SKIN: No lesions; no rash  . LYMPH NODES: No lymphadedenopathy  · MUSCULOSKELETAL:  No calf tenderness     Cardiac Cath 7/1/2020  PROCEDURE:  --  Right coronary angiography.  --  Left coronary angiography.    CORONARY VESSELS: The coronary circulation is right dominant.  LM:   --  LM: Normal.  LAD:   --  Proximal LAD: There was a 40 % stenosis.  --  Mid LAD: Angiography showed moderate atherosclerosis.  --  D1: Angiography showed severe atherosclerosis.  CX:   --  Circumflex: Angiography showed moderate atherosclerosis.  RCA:   --  Proximal RCA: There was a 100 % stenosis.    COMPLICATIONS: There were no complications.    DIAGNOSTIC RECOMMENDATIONS: The patient's anti-anginal regimen should be  further intensified.    IMPRESSION AND PLAN:  65 year old male with past medical history of 3v CAD s/p multiple PCI (vessels unknown 1999 and 2014), PVD s/p left LE intervention 2015, paroxysmal afib on apixaban, HTN, HLD, GERD, T2DM presenting with likely mixed type 1 and 2 NSTEMI s/p C with chronic severe CAD without PCI with plans for optimizing cardiac medications.     1) CAD   Known 3v CAD s/p multiple PCI (vessels unknown 1999 and 2014)  NSTEMI type 2  s/p C with chronic severe CAD without PCI.    ·	Continue medical management with aspirin 81 mg daily, clopidogrel 75 mg daily, metoprolol succinate 100 mg daily, lisinopril 10 mg daily, isosorbide dinitrate 15 mg BID.   ·	Started ranolazine 500 mg BID   ·	Followup outpatient with cardiologist/PCP.    2) pAF   Elevated CHADS2-Vasc; A/C indicated     ·	Continue dronedarone 400 mg BID, metoprolol succinate 100 mg daily, and apixaban 5 mg BID.     3) HTN   Well controlled.     ·	Continue medical management with amlodipine 5 mg daily, lisinopril 10 mg daily, metoprolol succinate 100 mg daily,  isosorbide dinitrate 15 mg BID.     4) HLD  CAD; statin benefit group    ·	Continue medical management with atorvastatin 40 mg nightly.     Acceptable for discharge     Michaela Jennings MD, MPH, GEMA, RPVI, FACC  Cardiovascular Specialist Attending  [facility]  C: [phone] (text preferred and/or call)  E: [email or web address]

## 2020-07-02 NOTE — DISCHARGE NOTE PROVIDER - HOSPITAL COURSE
65 year old  male h/o triple vessel CAD, MI x 2, PCI x 2 in [place] with stents x 4 (vessels unknown 1999 and 2014), PVD s/p left LE intervention and stent 2015, parox afib on Eliquis (LAST DOSE: Sunday 6/28), HTN, HLD, GERD, T2DM (managed by endo: Dr Currie,  A1C 7.2   complicated by PVD/CAD) who was driving from [place] to [place] and experienced chest pain radiating to left arm which resolved with massaging area and also c/o chest pain when climbing stairs, which was new. Pt admitted to [facility] 6/29/2020 and noted to have RC: peak Cr 1.6 (pt reports baseline 1.3) that normalized to 1.2(on 7/1) after IVF. Pt peak CKMB mass 10.9, now down trending. Labs 7/1 H/H 12.0/37.2, plts 184, wbc 6.44, bun 20, creat 1.2. Pt transferred to [facility] for left heart cath today. Prior to this event pt reports good exercise tolerance, no problem with stairs. Pt with laceration above left eye from hitting on door 6/29/2020.    Outpt cards in [place]: Dr Barak Arenas.  7/1: diagnostic LHC via [facility], med mgnt. Ranexa 500mg BID added, patient staying overnight to monitor and check QT interval on EKG in AM. Will be seen by cardiology in the morning for discharge clearance.  QTc on Renexa normal.  D/C to home.

## 2024-08-22 NOTE — DISCHARGE NOTE NURSING/CASE MANAGEMENT/SOCIAL WORK - NSDCPEELIQUISREACT_GEN_ALL_CORE
-Patient denies active thoughts of suicide.  - Discussed regarding behavioral health referral and medications.  
Apixaban/Eliquis increases your risk for bleeding. Notify your doctor if you experience any of the following side effects: bleeding, coughing or vomiting blood, red or black stool, unexpected pain or swelling, itching or hives, chest pain, chest tightness, trouble breathing, changes in how much or how often you urinate, red or pink urine, numbness or tingling in your feet, or unusual muscle weakness. When Apixaban/Eliquis is taken with other medicines, they can affect how it works. Taking other medications such as aspirin, blood thinners, nonsteroidal anti-inflammatories, and medications that treat depression can increase your risk of bleeding. It is very important to tell your health care provider about all of the other medicines, including over-the-counter medications, herbs, and vitamins you are taking. DO NOT start, stop, or change the dosage of any medicine, including over-the-counter medicines, vitamins, and herbal products without your doctor’s approval. Any products containing aspirin or are nonsteroidal anti-inflammatories lessen the blood’s ability to form clots and add to the effect of Apixaban/Eliquis. Never take aspirin or medicines that contain aspirin without speaking to your doctor.